# Patient Record
Sex: MALE | Race: WHITE | NOT HISPANIC OR LATINO | Employment: FULL TIME | URBAN - NONMETROPOLITAN AREA
[De-identification: names, ages, dates, MRNs, and addresses within clinical notes are randomized per-mention and may not be internally consistent; named-entity substitution may affect disease eponyms.]

---

## 2018-07-30 ENCOUNTER — NON-PROVIDER VISIT (OUTPATIENT)
Dept: URGENT CARE | Facility: PHYSICIAN GROUP | Age: 53
End: 2018-07-30

## 2018-07-30 PROCEDURE — 80305 DRUG TEST PRSMV DIR OPT OBS: CPT | Performed by: PHYSICIAN ASSISTANT

## 2018-09-07 ENCOUNTER — NON-PROVIDER VISIT (OUTPATIENT)
Dept: URGENT CARE | Facility: PHYSICIAN GROUP | Age: 53
End: 2018-09-07

## 2018-09-07 DIAGNOSIS — Z02.1 PRE-EMPLOYMENT DRUG SCREENING: ICD-10-CM

## 2018-09-07 PROCEDURE — 8907 PR URINE COLLECT ONLY: Performed by: NURSE PRACTITIONER

## 2018-12-14 ENCOUNTER — HOSPITAL ENCOUNTER (OUTPATIENT)
Dept: RADIOLOGY | Facility: MEDICAL CENTER | Age: 53
End: 2018-12-14
Attending: NURSE PRACTITIONER
Payer: COMMERCIAL

## 2018-12-14 DIAGNOSIS — M25.511 RIGHT SHOULDER PAIN, UNSPECIFIED CHRONICITY: ICD-10-CM

## 2021-09-02 ENCOUNTER — OCCUPATIONAL MEDICINE (OUTPATIENT)
Dept: URGENT CARE | Facility: CLINIC | Age: 56
End: 2021-09-02
Payer: COMMERCIAL

## 2021-09-02 VITALS
BODY MASS INDEX: 42.66 KG/M2 | HEIGHT: 72 IN | SYSTOLIC BLOOD PRESSURE: 148 MMHG | DIASTOLIC BLOOD PRESSURE: 96 MMHG | TEMPERATURE: 98.4 F | RESPIRATION RATE: 18 BRPM | HEART RATE: 82 BPM | OXYGEN SATURATION: 96 % | WEIGHT: 315 LBS

## 2021-09-02 DIAGNOSIS — M62.830 SPASM OF THORACIC BACK MUSCLE: ICD-10-CM

## 2021-09-02 PROCEDURE — 99203 OFFICE O/P NEW LOW 30 MIN: CPT | Performed by: PHYSICIAN ASSISTANT

## 2021-09-02 RX ORDER — CYCLOBENZAPRINE HCL 10 MG
10 TABLET ORAL 3 TIMES DAILY PRN
Qty: 30 TABLET | Refills: 0 | Status: SHIPPED | OUTPATIENT
Start: 2021-09-02 | End: 2021-09-07 | Stop reason: SDUPTHER

## 2021-09-02 ASSESSMENT — ENCOUNTER SYMPTOMS
CHILLS: 0
SHORTNESS OF BREATH: 0
NECK PAIN: 0
WHEEZING: 0
ROS SKIN COMMENTS: NO BRUISING
MYALGIAS: 1
COUGH: 0
FALLS: 0
PALPITATIONS: 0
VOMITING: 0
DIZZINESS: 0
FEVER: 0
NAUSEA: 0
BACK PAIN: 1
ABDOMINAL PAIN: 0
HEADACHES: 0
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
## 2021-09-07 ENCOUNTER — OCCUPATIONAL MEDICINE (OUTPATIENT)
Dept: URGENT CARE | Facility: PHYSICIAN GROUP | Age: 56
End: 2021-09-07
Payer: COMMERCIAL

## 2021-09-07 VITALS
WEIGHT: 315 LBS | RESPIRATION RATE: 16 BRPM | SYSTOLIC BLOOD PRESSURE: 136 MMHG | HEIGHT: 72 IN | TEMPERATURE: 97 F | BODY MASS INDEX: 42.66 KG/M2 | HEART RATE: 88 BPM | OXYGEN SATURATION: 91 % | DIASTOLIC BLOOD PRESSURE: 96 MMHG

## 2021-09-07 DIAGNOSIS — M62.830 SPASM OF THORACIC BACK MUSCLE: ICD-10-CM

## 2021-09-07 PROCEDURE — 99213 OFFICE O/P EST LOW 20 MIN: CPT | Performed by: PHYSICIAN ASSISTANT

## 2021-09-07 RX ORDER — CYCLOBENZAPRINE HCL 10 MG
10 TABLET ORAL 3 TIMES DAILY PRN
Qty: 30 TABLET | Refills: 0 | Status: SHIPPED | OUTPATIENT
Start: 2021-09-12

## 2021-09-07 ASSESSMENT — ENCOUNTER SYMPTOMS
MYALGIAS: 1
BACK PAIN: 1
NAUSEA: 0
FOCAL WEAKNESS: 0
COUGH: 0
VOMITING: 0
WEAKNESS: 0
CHILLS: 0
SENSORY CHANGE: 0
TINGLING: 0
WHEEZING: 0
FEVER: 0
SHORTNESS OF BREATH: 0

## 2021-09-07 ASSESSMENT — PAIN SCALES - GENERAL: PAINLEVEL: 5=MODERATE PAIN

## 2021-09-07 NOTE — LETTER
[facility] [place] [address]  Phone:  [phone] - Fax:  [phone]   [facility] Progress Report and Disability Certification  Date of Service: 2021   No Show:  No  Date / Time of Next Visit: 2021   Claim Information   Patient Name: Greg Velazquez  Claim Number:     Employer: [employer]  Date of Injury: 2021     Insurer / TPA: Satya  ID / SSN:     Occupation:   Diagnosis: The encounter diagnosis was Spasm of thoracic back muscle.    Medical Information   Related to Industrial Injury? Yes    Subjective Complaints:  DOI: 2021. Patient is here for a follow-up on right thoracic strain/spasm. He reports he is 50% better. He continues to have pain with certain movements. He has been taking the Flexeril (not at work) and it has been helping. He takes OTC Ibuprofen prn. He also has been resting, using heat and massage. He denies any lower or upper extremity weakness or paralysis. No bowel or bladder changes.    Objective Findings: Vitals reviewed.  Thoracic back: NTTP. Area of pain in right thoracic paraspinal muscles. Pain with twisting, standing from a seated position. Upper and lower extremities with FROM and distal n/v intact   Pre-Existing Condition(s):     Assessment:   Condition Improved    Status: Additional Care Required  Permanent Disability:No    Plan: Medication (NOT at Work)  Comments:RX for flexeril- postdated. Do not take at work. OTC NSAIDS. Heat, massage. RTC 1 week.    Diagnostics:      Comments:       Disability Information   Status: Released to Restricted Duty    From:  2021  Through: 2021 Restrictions are: Temporary   Physical Restrictions   Sitting:    Standing:    Stoopin hrs/day Bendin hrs/day   Squattin hrs/day Walking:    Climbin hrs/day Pushin hrs/day   Pulling:    Other:    Reaching Above Shoulder (L):   Reaching Above Shoulder (R):       Reaching Below  Shoulder (L):    Reaching Below Shoulder (R):      Not to exceed Weight Limits   Carrying(hrs):   Weight Limit(lb): < or = to 10 pounds Lifting(hrs):   Weight  Limit(lb): < or = to 10 pounds   Comments:      Repetitive Actions   Hands: i.e. Fine Manipulations from Grasping:     Feet: i.e. Operating Foot Controls:     Driving / Operate Machinery:     Health Care Provider’s Original or Electronic Signature  Candi Riley P.A.-C. Health Care Provider’s Original or Electronic Signature    Clem Smith MD       Clinic Name / Location: [address] [address] Clinic Phone Number: Dept: [phone]   Appointment Time: 9:00 Am Visit Start Time: 9:27 AM   Check-In Time:  8:49 Am Visit Discharge Time:  9:54 AM   Original-Treating Physician or Chiropractor    Page 2-Insurer/TPA    Page 3-Employer    Page 4-Employee

## 2021-09-07 NOTE — PROGRESS NOTES
Subjective     Greg Velazquez is a 55 y.o. male who presents with Work-Related Injury (Follow up, mid back injury )      DOI: 8/25/2021. Patient is here for a follow-up on right thoracic strain/spasm. He reports he is 50% better. He continues to have pain with certain movements. He has been taking the Flexeril (not at work) and it has been helping. He takes OTC Ibuprofen prn. He also has been resting, using heat and massage. He denies any lower or upper extremity weakness or paralysis. No bowel or bladder changes.      HPI    No past medical history on file.    No past surgical history on file.    No family history on file.    No Known Allergies    Medications, Allergies, and current problem list reviewed today in Epic    Review of Systems   Constitutional: Negative for chills, fever and malaise/fatigue.   Respiratory: Negative for cough, shortness of breath and wheezing.    Gastrointestinal: Negative for nausea and vomiting.   Musculoskeletal: Positive for back pain and myalgias.   Neurological: Negative for tingling, sensory change, focal weakness and weakness.              Objective     /96   Pulse 88   Temp 36.1 °C (97 °F) (Temporal)   Resp 16   Ht 1.829 m (6')   Wt (!) 159 kg (350 lb)   SpO2 91%   BMI 47.47 kg/m²      Physical Exam  Constitutional:       General: He is not in acute distress.     Appearance: He is not ill-appearing.   HENT:      Head: Normocephalic and atraumatic.   Eyes:      Conjunctiva/sclera: Conjunctivae normal.   Cardiovascular:      Rate and Rhythm: Normal rate.   Pulmonary:      Effort: Pulmonary effort is normal. No respiratory distress.   Skin:     General: Skin is warm and dry.   Neurological:      General: No focal deficit present.      Mental Status: He is alert and oriented to person, place, and time.   Psychiatric:         Mood and Affect: Mood normal.         Behavior: Behavior normal.         Thought Content: Thought content normal.         Judgment: Judgment  normal.         Vitals reviewed.  Thoracic back: NTTP. Area of pain in right thoracic paraspinal muscles. Pain with twisting, standing from a seated position. Upper and lower extremities with FROM and distal n/v intact                   Assessment & Plan        1. Spasm of thoracic back muscle    - cyclobenzaprine (FLEXERIL) 10 mg Tab; Take 1 Tablet by mouth 3 times a day as needed. Do not fill until 9/11/2021.  Dispense: 30 Tablet; Refill: 0  Sedation side effects discussed. No Driving or alcohol with medication given.    Restrictions per D-39  Heat, Ice, Massage  RTC 7 days.    Differential diagnoses, Supportive care, and indications for immediate follow-up discussed with patient.   Pathogenesis of diagnosis discussed including typical length and natural progression.   Instructed to return to clinic or nearest emergency department for any change in condition, further concerns, or worsening of symptoms.    The patient demonstrated a good understanding and agreed with the treatment plan.    Candi Riley P.A.-C.

## 2021-09-14 ENCOUNTER — OCCUPATIONAL MEDICINE (OUTPATIENT)
Dept: URGENT CARE | Facility: PHYSICIAN GROUP | Age: 56
End: 2021-09-14
Payer: COMMERCIAL

## 2021-09-14 VITALS
DIASTOLIC BLOOD PRESSURE: 74 MMHG | OXYGEN SATURATION: 100 % | SYSTOLIC BLOOD PRESSURE: 152 MMHG | TEMPERATURE: 96.9 F | HEIGHT: 75 IN | WEIGHT: 315 LBS | RESPIRATION RATE: 16 BRPM | BODY MASS INDEX: 39.17 KG/M2 | HEART RATE: 72 BPM

## 2021-09-14 DIAGNOSIS — M62.830 SPASM OF THORACIC BACK MUSCLE: ICD-10-CM

## 2021-09-14 PROCEDURE — 99214 OFFICE O/P EST MOD 30 MIN: CPT | Performed by: PHYSICIAN ASSISTANT

## 2021-09-14 NOTE — LETTER
[facility]  [address] [place] [address] [place] [address]  Phone:  [phone] - Fax:  [phone]   [facility] Progress Report and Disability Certification  Date of Service: 9/14/2021   No Show:  No  Date / Time of Next Visit: 9/21/2021   Claim Information   Patient Name: Greg Velazquez  Claim Number:     Employer: [employer]  Date of Injury: 8/25/2021     Insurer / TPA: [employer]  ID / SSN:     Occupation:   Diagnosis: The encounter diagnosis was Spasm of thoracic back muscle.    Medical Information   Related to Industrial Injury? Yes    Subjective Complaints:  DOI: 9/2/21 -Patient returns for third Worker's Comp. evaluation.  At time of original injury patient describes a whiplash type injury while backing 1/5 wheel and to hook it up to a trailer.  Patient returns to clinic stating: He feels that he is approximately 80% improved.  He denies numbness tingling or weakness.  He notes some significant improvement particularly focal muscular spasm on right mid back that have been bothersome before.  He would like to trial full duty.  He denies history of back surgery or significant injury.  He has tried anti-inflammatories and has used muscle relaxants for nighttime.   Objective Findings: Gen: AOx3; Head: NC AT; Eyes: PERRLA/EOM; Lungs: NLR; Cardiac: RR by periph pulse exam; thoracic back: Grossly normal no erythema ecchymosis or edema, no midline or bony tenderness to palpation, right paraspinal musculature tenderness to palpation spasm just below medial border of right scapula, no rash; neuro: N VID, normal sensation to light touch, interosseous strength 5 out of 5, +2 radial pulses   Pre-Existing Condition(s):     Assessment:   Condition Improved    Status: Additional Care Required  Permanent Disability:No    Plan:   Comments:Full duty, OTC NSAIDs, Rx muscle relaxant for nighttime only, ice heat stretching, follow-up in 1 week      Diagnostics:      Comments:   Full duty, OTC NSAIDs, Rx muscle relaxant for nighttime only, ice heat stretching, follow-up in 1 week     Disability Information   Status: Released to Full Duty    From:  9/14/2021  Through: 9/21/2021 Restrictions are: Temporary   Physical Restrictions   Sitting:    Standing:    Stooping:    Bending:      Squatting:    Walking:    Climbing:    Pushing:      Pulling:    Other:    Reaching Above Shoulder (L):   Reaching Above Shoulder (R):       Reaching Below Shoulder (L):    Reaching Below Shoulder (R):      Not to exceed Weight Limits   Carrying(hrs):   Weight Limit(lb):   Lifting(hrs):   Weight  Limit(lb):     Comments: Full duty, OTC NSAIDs, Rx muscle relaxant for nighttime only, ice heat stretching, follow-up in 1 week     Repetitive Actions   Hands: i.e. Fine Manipulations from Grasping:     Feet: i.e. Operating Foot Controls:     Driving / Operate Machinery:     Health Care Provider’s Original or Electronic Signature  Alonzo Nichols P.A.-C. Health Care Provider’s Original or Electronic Signature    lCem Smith MD       Clinic Name / Location: [address] [address] Clinic Phone Number: Dept: [phone]   Appointment Time: 9:15 Am Visit Start Time: 9:19 AM   Check-In Time:  9:15 Am Visit Discharge Time: 10:00 AM   Original-Treating Physician or Chiropractor    Page 2-Insurer/TPA    Page 3-Employer    Page 4-Employee

## 2021-09-14 NOTE — PROGRESS NOTES
"Subjective:     Greg Velazquez is a 55 y.o. male who presents for Follow-Up ([facility])      DOI: 9/2/21 -Patient returns for third Worker's Comp. evaluation.  At time of original injury patient describes a whiplash type injury while backing 1/5 wheel and to hook it up to a trailer.  Patient returns to clinic stating: He feels that he is approximately 80% improved.  He denies numbness tingling or weakness.  He notes some significant improvement particularly focal muscular spasm on right mid back that have been bothersome before.  He would like to trial full duty.  He denies history of back surgery or significant injury.  He has tried anti-inflammatories and has used muscle relaxants for nighttime.    PMH:   No pertinent past medical history to this problem  MEDS:  Medications were reviewed in EMR  ALLERGIES:  Allergies were reviewed in EMR  [employer]:  Works as a   FH:   No pertinent family history to this problem       Objective:     /74   Pulse 72   Temp 36.1 °C (96.9 °F)   Resp 16   Ht 1.905 m (6' 3\")   Wt (!) 158 kg (348 lb)   SpO2 100%   BMI 43.50 kg/m²     Gen: AOx3; Head: NC AT; Eyes: PERRLA/EOM; Lungs: NLR; Cardiac: RR by periph pulse exam; thoracic back: Grossly normal no erythema ecchymosis or edema, no midline or bony tenderness to palpation, right paraspinal musculature tenderness to palpation spasm just below medial border of right scapula, no rash; neuro: N VID, normal sensation to light touch, interosseous strength 5 out of 5, +2 radial pulses    Assessment/Plan:       1. Spasm of thoracic back muscle    • Released to Full Duty FROM 9/14/2021 TO 9/21/2021  • Full duty, OTC NSAIDs, Rx muscle relaxant for nighttime only, ice heat stretching, follow-up in 1 week   • Full duty, OTC NSAIDs, Rx muscle relaxant for nighttime only, ice heat stretching, follow-up in 1 week     Differential diagnosis, natural history, supportive care, and indications for immediate follow-up discussed.    My " total time spent caring for the patient on the day of the encounter was 30 minutes.   This does not include time spent on separately billable procedures/tests.

## 2021-09-21 ENCOUNTER — OCCUPATIONAL MEDICINE (OUTPATIENT)
Dept: URGENT CARE | Facility: PHYSICIAN GROUP | Age: 56
End: 2021-09-21
Payer: COMMERCIAL

## 2021-09-21 VITALS
WEIGHT: 315 LBS | TEMPERATURE: 98.6 F | RESPIRATION RATE: 16 BRPM | SYSTOLIC BLOOD PRESSURE: 120 MMHG | HEART RATE: 85 BPM | OXYGEN SATURATION: 98 % | DIASTOLIC BLOOD PRESSURE: 54 MMHG | BODY MASS INDEX: 42.66 KG/M2 | HEIGHT: 72 IN

## 2021-09-21 DIAGNOSIS — M62.830 SPASM OF THORACIC BACK MUSCLE: ICD-10-CM

## 2021-09-21 PROCEDURE — 99213 OFFICE O/P EST LOW 20 MIN: CPT | Performed by: NURSE PRACTITIONER

## 2021-09-21 ASSESSMENT — ENCOUNTER SYMPTOMS
MYALGIAS: 0
NECK PAIN: 0
BACK PAIN: 0

## 2021-09-21 NOTE — LETTER
[address] [place] [address]  Phone:  [phone] - Fax:  [phone]   [facility] Progress Report and Disability Certification  Date of Service: 9/21/2021   No Show:  No  Date / Time of Next Visit:     Claim Information   Patient Name: Greg Velazquez  Claim Number:     Employer: [employer]  Date of Injury: 8/25/2021     Insurer / TPA: [employer]  ID / SSN:     Occupation:   Diagnosis: The encounter diagnosis was Spasm of thoracic back muscle.    Medical Information   Related to Industrial Injury? Yes    Subjective Complaints:  DOI: 8/21/21. Patient is 55 year old here follow up on upper back pain after injury at work. Patient reports complete resolution at this time. Tolerating full duty. Requests MMI>   Objective Findings: Physical Exam  Constitutional:       Appearance: Normal appearance.   Cardiovascular:      Rate and Rhythm: Normal rate and regular rhythm.      Heart sounds: No murmur heard.     Pulmonary:      Effort: Pulmonary effort is normal.      Breath sounds: Normal breath sounds.   Musculoskeletal:         General: No tenderness. Normal range of motion.   Skin:     General: Skin is warm and dry.   Neurological:      General: No focal deficit present.      Mental Status: He is alert and oriented to person, place, and time.        Pre-Existing Condition(s):     Assessment:   Condition Improved    Status: Discharged /  MMI  Permanent Disability:No    Plan:      Diagnostics:      Comments:       Disability Information   Status: Released to Full Duty    From:     Through:   Restrictions are:     Physical Restrictions   Sitting:    Standing:    Stooping:    Bending:      Squatting:    Walking:    Climbing:    Pushing:      Pulling:    Other:    Reaching Above Shoulder (L):   Reaching Above Shoulder (R):       Reaching Below Shoulder (L):    Reaching Below Shoulder (R):      Not to exceed Weight Limits   Carrying(hrs):   Weight  Limit(lb):   Lifting(hrs):   Weight  Limit(lb):     Comments:      Repetitive Actions   Hands: i.e. Fine Manipulations from Grasping:     Feet: i.e. Operating Foot Controls:     Driving / Operate Machinery:     Health Care Provider’s Original or Electronic Signature  JENI Aldana. Health Care Provider’s Original or Electronic Signature    Clem Smith MD         Clinic Name / Location: [address] [address] Clinic Phone Number: Dept: [phone]   Appointment Time: 6:00 Pm Visit Start Time: 5:59 PM   Check-In Time:  5:40 Pm Visit Discharge Time:  6:16pm   Original-Treating Physician or Chiropractor    Page 2-Insurer/TPA    Page 3-Employer    Page 4-Employee

## 2021-09-22 NOTE — PROGRESS NOTES
Subjective     Greg Velazquez is a 55 y.o. male who presents with Work-Related Injury ([facility] no new symptoms )      DOI: 8/21/21. Patient is 55 year old here follow up on upper back pain after injury at work. Patient reports complete resolution at this time. Tolerating full duty. Requests MMI>   Patient has no known allergies.  Current Outpatient Medications on File Prior to Visit   Medication Sig Dispense Refill   • cyclobenzaprine (FLEXERIL) 10 mg Tab Take 1 Tablet by mouth 3 times a day as needed. Do not fill until 9/11/2021. 30 Tablet 0     No current facility-administered medications on file prior to visit.     Social History     Socioeconomic History   • Marital status: Unknown     Spouse name: Not on file   • Number of children: Not on file   • Years of education: Not on file   • Highest education level: Not on file   Occupational History   • Not on file   Tobacco Use   • Smoking status: Former Smoker   • Smokeless tobacco: Never Used   Vaping Use   • Vaping Use: Never used   Substance and Sexual Activity   • Alcohol use: Not on file   • Drug use: Not on file   • Sexual activity: Not on file   Other Topics Concern   • Not on file   Social History Narrative   • Not on file     Social Determinants of Health     Financial Resource Strain:    • Difficulty of Paying Living Expenses:    Food Insecurity:    • Worried About Running Out of Food in the Last Year:    • Ran Out of Food in the Last Year:    Transportation Needs:    • Lack of Transportation (Medical):    • Lack of Transportation (Non-Medical):    Physical Activity:    • Days of Exercise per Week:    • Minutes of Exercise per Session:    Stress:    • Feeling of Stress :    Social Connections:    • Frequency of Communication with Friends and Family:    • Frequency of Social Gatherings with Friends and Family:    • Attends [employer] Services:    • Active Member of Clubs or Organizations:    • Attends Club or Organization Meetings:    • Marital Status:     Intimate Partner Violence:    • Fear of Current or Ex-Partner:    • Emotionally Abused:    • Physically Abused:    • Sexually Abused:      Breast Cancer-related family history is not on file.    HPI    Review of Systems   Musculoskeletal: Negative for back pain, myalgias and neck pain.              Objective     /54   Pulse 85   Temp 37 °C (98.6 °F) (Temporal)   Resp 16   Ht 1.829 m (6')   Wt (!) 158 kg (348 lb)   SpO2 98%   BMI 47.20 kg/m²      Physical Exam  Constitutional:       Appearance: Normal appearance.   Cardiovascular:      Rate and Rhythm: Normal rate and regular rhythm.      Heart sounds: No murmur heard.     Pulmonary:      Effort: Pulmonary effort is normal.      Breath sounds: Normal breath sounds.   Musculoskeletal:         General: No tenderness. Normal range of motion.   Skin:     General: Skin is warm and dry.   Neurological:      General: No focal deficit present.      Mental Status: He is alert and oriented to person, place, and time.         Physical Exam  Constitutional:       Appearance: Normal appearance.   Cardiovascular:      Rate and Rhythm: Normal rate and regular rhythm.      Heart sounds: No murmur heard.     Pulmonary:      Effort: Pulmonary effort is normal.      Breath sounds: Normal breath sounds.   Musculoskeletal:         General: No tenderness. Normal range of motion.   Skin:     General: Skin is warm and dry.   Neurological:      General: No focal deficit present.      Mental Status: He is alert and oriented to person, place, and time.                        Assessment & Plan           1. Spasm of thoracic back muscle       MMI  Follow up as needed.